# Patient Record
Sex: MALE | Race: WHITE
[De-identification: names, ages, dates, MRNs, and addresses within clinical notes are randomized per-mention and may not be internally consistent; named-entity substitution may affect disease eponyms.]

---

## 2017-01-22 ENCOUNTER — HOSPITAL ENCOUNTER (EMERGENCY)
Dept: HOSPITAL 33 - ED | Age: 82
Discharge: HOME | End: 2017-01-22
Payer: MEDICARE

## 2017-01-22 VITALS — HEART RATE: 72 BPM | SYSTOLIC BLOOD PRESSURE: 125 MMHG | DIASTOLIC BLOOD PRESSURE: 55 MMHG

## 2017-01-22 VITALS — SYSTOLIC BLOOD PRESSURE: 165 MMHG | HEART RATE: 75 BPM | OXYGEN SATURATION: 97 % | DIASTOLIC BLOOD PRESSURE: 70 MMHG

## 2017-01-22 VITALS — OXYGEN SATURATION: 91 %

## 2017-01-22 DIAGNOSIS — T88.9XXA: ICD-10-CM

## 2017-01-22 DIAGNOSIS — R06.02: Primary | ICD-10-CM
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
## 2017-01-22 DIAGNOSIS — S50.812A: Primary | ICD-10-CM

## 2017-01-22 LAB
ANION GAP SERPL CALC-SCNC: 13.9 MEQ/L (ref 5–15)
BASOPHILS NFR BLD AUTO: 0.1 % (ref 0–0.4)
BUN SERPL-MCNC: 34 MG/DL (ref 9–20)
CHLORIDE SERPL-SCNC: 103 MEQ/L (ref 98–107)
CO2 SERPL-SCNC: 30.1 MEQ/L (ref 21–32)
GLUCOSE SERPL-MCNC: 129 MG/DL (ref 70–110)
MCH RBC QN AUTO: 28.7 PG (ref 26–32)
NEUTROPHILS NFR BLD AUTO: 47.1 % (ref 36–66)
PLATELET # BLD AUTO: 124 K/MM3 (ref 150–450)
POTASSIUM SERPLBLD-SCNC: 3.6 MEQ/L (ref 3.5–5.1)
RBC # BLD AUTO: 3.93 M/MM3 (ref 4.1–5.6)
SODIUM SERPL-SCNC: 143 MEQ/L (ref 136–145)
WBC # BLD AUTO: 7.5 K/MM3 (ref 4–10.5)

## 2017-01-22 PROCEDURE — 96374 THER/PROPH/DIAG INJ IV PUSH: CPT

## 2017-01-22 PROCEDURE — 85025 COMPLETE CBC W/AUTO DIFF WBC: CPT

## 2017-01-22 PROCEDURE — 80048 BASIC METABOLIC PNL TOTAL CA: CPT

## 2017-01-22 PROCEDURE — 96375 TX/PRO/DX INJ NEW DRUG ADDON: CPT

## 2017-01-22 PROCEDURE — 99282 EMERGENCY DEPT VISIT SF MDM: CPT

## 2017-01-22 PROCEDURE — 94640 AIRWAY INHALATION TREATMENT: CPT

## 2017-01-22 PROCEDURE — 99284 EMERGENCY DEPT VISIT MOD MDM: CPT

## 2017-01-22 PROCEDURE — 36000 PLACE NEEDLE IN VEIN: CPT

## 2017-01-22 PROCEDURE — 96372 THER/PROPH/DIAG INJ SC/IM: CPT

## 2017-01-22 PROCEDURE — 93005 ELECTROCARDIOGRAM TRACING: CPT

## 2017-01-22 PROCEDURE — 96360 HYDRATION IV INFUSION INIT: CPT

## 2017-01-22 PROCEDURE — 36415 COLL VENOUS BLD VENIPUNCTURE: CPT

## 2017-01-22 NOTE — ERPHSYRPT
- History of Present Illness


Time Seen by Provider: 01/22/17 08:22


Source: patient


Exam Limitations: no limitations


Patient Subjective Stated Complaint: pt reports his cat scratched him 3 days ago

-reports redness to left forearm


Triage Nursing Assessment: pt pink warm et dry-a & o x 3-redness et warmth 

noted to left forearm-radial pulse strong


Physician History: 





pt reports his cat scratched him 3 days ago-reports redness to left forearm


Timing/Duration: day(s) (5-6 days ago)


Severity: mild


Modifying Factors: Improves With: cold therapy


Associated Symptoms: denies symptoms


Allergies/Adverse Reactions: 








No Known Drug Allergies Allergy (Verified 01/22/17 08:00)


 





Home Medications: 








Levothyroxine Sodium 75 Mcg*** [Synthroid 75 Mcg***] 75 mcg PO DAILY 09/30/16 [

History]


Ropinirole HCl 0.5 mg*** [Requip 0.5 MG***] 1 mg PO DAILY 09/30/16 [History]


Albuterol 17 gm IH QID 12/08/16 [History]


Atorvastatin Calcium [Lipitor 20MG Tablet] 20 mg PO DAILY 12/08/16 [History]


Furosemide 20 mg*** [Lasix 20 mg***] 20 mg PO DAILY 12/08/16 [History]


Hydrocodone Bit/Acetaminophen [Norco  Tablet] 1 tab PO Q6H PRN 12/08/16 [

History]


Ipratropium Bromide 0.2 mg IH QID 12/08/16 [History]


Metoprolol Succinate 50 mg*** [Toprol Xl 50 MG***] 50 mg PO DAILY 12/08/16 [

History]


Furosemide 40 mg*** [Lasix 40 MG***] 40 mg PO DAILY 12/21/16 [History]





Hx Tetanus, Diphtheria Vaccination/Date Given: No


Hx Influenza Vaccination/Date Given: No


Hx Pneumococcal Vaccination/Date Given: No


Immunizations Up to Date: Yes





- Review of Systems


Constitutional: No Symptoms


Eyes: No Symptoms


Ears, Nose, & Throat: No Symptoms


Respiratory: No Symptoms


Cardiac: No Symptoms


Abdominal/Gastrointestinal: No Symptoms


Genitourinary Symptoms: No Symptoms


Musculoskeletal: No Symptoms


Skin: Other (cat scratchn on left forearm)





- Past Medical History


Pertinent Past Medical History: Yes


Neurological History: Other


ENT History: No Pertinent History


Cardiac History: High Cholesterol, Hypertension


Respiratory History: Asthma, Bronchitis, COPD, Emphysema, Sleep Apnea


Endocrine Medical History: No Pertinent History


Musculoskeletal History: Arthritis


GI Medical History: Hemorrhoids, Hernia


 History: No Pertinent History


Psycho-Social History: No Pertinent History


Male Reproductive Disorders: No Pertinent History


Other Medical History: skin cancer, Dizziness





- Past Surgical History


Past Surgical History: Yes


Neuro Surgical History: No Pertinent History


Cardiac: Other


Respiratory: No Pertinent History


Gastrointestinal: Hernia Repair


Genitourinary: No Pertinent History


Musculoskeletal: No Pertinent History


Male Surgical History: No Pertinent History


Other Surgical History: Abdominal aortic aneurysm repair, right ear surgery x3,





- Social History


Smoking Status: Former smoker


How long have you smoked: 70 YEARS


Exposure to second hand smoke: No


Drug Use: none


Patient Lives Alone: No





- Nursing Vital Signs


Nursing Vital Signs: 





 Initial Vital Signs











Temperature                    97.9 F


 


Temperature Source             Oral


 


Pulse Rate                     72


 


Respiratory Rate               22


 


Blood Pressure [Right Arm]     170/69


 


Pain Intensity                 0

















- Physical Exam


General Appearance: no apparent distress


Eye Exam: PERRL/EOMI


Ears, Nose, Throat Exam: normal ENT inspection


Extremity Exam: inflammation (left forearm), swelling


SpO2: 98


Oxygen Delivery: Room Air





- Course


Nursing assessment & vital signs reviewed: Yes





- Progress


Progress: improved


Counseled pt/family regarding: diagnosis, need for follow-up





- Departure


Time of Disposition: 08:23


Departure Disposition: Home


Clinical Impression: 


Cat scratch of forearm


Qualifiers:


 Encounter type: initial encounter Laterality: left Qualified Code(s): S50.812A 

- Abrasion of left forearm, initial encounter; W55.03XA - Scratched by cat, 

initial encounter





Condition: Stable


Critical Care Time: No


Referrals: 


ARCHANA YOO MD [Primary Care Provider] - 


Instructions:  Cat Scratch Disease/Fever


Additional Instructions: 


Please follow the instructions given to you.  Please take your medication as 

prescribed if given.  If symptoms recur or get worse, come back to the 

emergency room if you cannot reach your primary care physician, or call your 

primary care physician for an appointment.  Again if your symptoms get worse, 

come back to the emergency room.  Thanks for visiting emergency room, and let 

us take care of you.


Prescriptions: 


Mupirocin*** [Bactroban OINTMENT***] 1 gm TP BID #30 tube


Cephalexin Mh 500 mg** [Keflex 500 mg**] 500 mg PO Q6H #40 capsule

## 2017-01-22 NOTE — ERPHSYRPT
- History of Present Illness


Time Seen by Provider: 01/22/17 13:10


Source: patient, EMS


Exam Limitations: no limitations


Patient Subjective Stated Complaint: PT BROUGHT TO ED PER EMS REPORTS INCREASED 

SOB-PT STARTED ON NEW ANTIBIOTIC ANGIE 30 MIN AFTER TAKING-DENIES PAIN


Triage Nursing Assessment: PT FLUSHED WARM UPON ARRIVAL-INCREASED WORK OF 

BREATHING NOTED-WHEEZE NOTED PRIOR TO NEB TX-REDNESS NOTED BODYWIDE


Physician History: 





80-year-old male came to the emergency room with complaining of shortness of 

breath.  34 hours ago.  Patient was seen in the emergency room when he came 

with complaining of cat scratch on his left forearm and was given intramuscular 

Rocephin and cephalexin 500 mg 4 times a day.  Patient went home and took the 

first dose of cephalexin and in half an hour he started having epigastric pain, 

substernal pain, which shortness of breath.  So he called ambulance and patient 

was brought into the emergency room.  Patient did not have any chest pain when 

he came to the emergency room was mildly short of breath.  He was complaining 

of some epigastric discomfort.


Timing/Duration: today


Severity of Dyspnea-Max: mild


Severity of Dyspnea-Current: mild


Possible Cause: no prior episodes


Modifying Factors: Improves With: nothing


Associated Symptoms: denies symptoms


Allergies/Adverse Reactions: 








No Known Drug Allergies Allergy (Verified 01/22/17 12:32)


 





Home Medications: 








Levothyroxine Sodium 75 Mcg*** [Synthroid 75 Mcg***] 75 mcg PO DAILY 09/30/16 [

History]


Ropinirole HCl 0.5 mg*** [Requip 0.5 MG***] 1 mg PO DAILY 09/30/16 [History]


Albuterol 17 gm IH QID 12/08/16 [History]


Atorvastatin Calcium [Lipitor 20MG Tablet] 20 mg PO DAILY 12/08/16 [History]


Furosemide 20 mg*** [Lasix 20 mg***] 20 mg PO DAILY 12/08/16 [History]


Hydrocodone Bit/Acetaminophen [Norco  Tablet] 1 tab PO Q6H PRN 12/08/16 [

History]


Ipratropium Bromide 0.2 mg IH QID 12/08/16 [History]


Metoprolol Succinate 50 mg*** [Toprol Xl 50 MG***] 50 mg PO DAILY 12/08/16 [

History]


Furosemide 40 mg*** [Lasix 40 MG***] 40 mg PO DAILY 12/21/16 [History]





Hx Tetanus, Diphtheria Vaccination/Date Given: No


Hx Influenza Vaccination/Date Given: No


Hx Pneumococcal Vaccination/Date Given: No


Immunizations Up to Date: Yes





- Review of Systems


Constitutional: No Symptoms


Eyes: No Symptoms


Ears, Nose, & Throat: No Symptoms


Respiratory: Cough, Dyspnea


Cardiac: No Symptoms


Abdominal/Gastrointestinal: No Symptoms


Genitourinary Symptoms: No Symptoms


Musculoskeletal: No Symptoms


Skin: No Symptoms





- Past Medical History


Pertinent Past Medical History: Yes


Neurological History: Other


ENT History: No Pertinent History


Cardiac History: High Cholesterol, Hypertension


Respiratory History: Asthma, Bronchitis, COPD, Emphysema, Sleep Apnea


Endocrine Medical History: No Pertinent History


Musculoskeletal History: Arthritis


GI Medical History: Hemorrhoids, Hernia


 History: No Pertinent History


Psycho-Social History: No Pertinent History


Male Reproductive Disorders: No Pertinent History


Other Medical History: skin cancer, Dizziness





- Past Surgical History


Past Surgical History: Yes


Neuro Surgical History: No Pertinent History


Cardiac: Other


Respiratory: No Pertinent History


Gastrointestinal: Hernia Repair


Genitourinary: No Pertinent History


Musculoskeletal: No Pertinent History


Male Surgical History: No Pertinent History


Other Surgical History: Abdominal aortic aneurysm repair, right ear surgery x3,





- Social History


Smoking Status: Former smoker


How long have you smoked: 70 YEARS


Exposure to second hand smoke: No


Drug Use: none


Patient Lives Alone: No





- Nursing Vital Signs


Nursing Vital Signs: 





 Initial Vital Signs











Pulse Rate                     78


 


Respiratory Rate               28


 


Blood Pressure [Right Arm]     136/70

















- Physical Exam


General Appearance: mild distress


Eye Exam: PERRL/EOMI


Ears, Nose, Throat Exam: hearing grossly normal


Neck Exam: normal inspection


Respiratory Exam: diminished breath sounds, wheezing


Cardiovascular/Chest Exam: normal heart sounds


Abdominal/Gastrointestinal Exam: soft


Extremity Exam: non-tender


**SpO2 Interpretation**: borderline oxygenation


SpO2: 91


Oxygen Delivery: Nasal Cannula





- Course


Nursing assessment & vital signs reviewed: Yes


Ordered Tests: 





 Active Orders 24 hr











 Category Date Time Status


 


 EKG-ER Only STAT Care  01/22/17 12:44 Active


 


 IV Insertion STAT Care  01/22/17 12:44 Active


 


 IV Insertion-2nd Peripheral STAT Care  01/22/17 12:44 Active


 


 Oxygen-ED Only NASAL CANNULA 2 lpm Care  01/22/17 12:45 Active


 


 BMP Stat Lab  01/22/17 12:28 Received


 


 CBC W DIFF Stat Lab  01/22/17 12:28 Completed


 


 Respiratory Nebulizer STAT RT  01/22/17 12:31 Completed








Medication Summary











Generic Name Dose Route Start Last Admin





  Trade Name Sherley  PRN Reason Stop Dose Admin


 


Sodium Chloride  1,000 mls @ 50 mls/hr  01/22/17 12:30  01/22/17 12:38





  Sodium Chloride 0.9% 1000 Ml  IV  02/21/17 12:29  50 mls/hr





  .Q20H FREDERICK   Administration














Discontinued Medications














Generic Name Dose Route Start Last Admin





  Trade Name Sherley  PRN Reason Stop Dose Admin


 


Albuterol Sulfate  2.5 mg  01/22/17 12:30  01/22/17 12:35





  Proventil 2.5 Mg/3 Ml Neb***  IH  01/22/17 12:31  2.5 mg





  STAT ONE   Administration


 


Albuterol Sulfate  Confirm  01/22/17 12:31  





  Proventil 2.5 Mg/3 Ml Neb***  Administered  01/22/17 12:32  





  Dose   





  2.5 mg   





  IH   





  .STK-MED ONE   


 


Famotidine  20 mg  01/22/17 12:19  01/22/17 12:38





  Pepcid 20 Mg Vial***  IV  01/22/17 12:20  20 mg





  STAT ONE   Administration


 


Famotidine  Confirm  01/22/17 12:34  





  Pepcid 20 Mg Vial***  Administered  01/22/17 12:35  





  Dose   





  20 mg   





  IV   





  .STK-MED ONE   


 


Sodium Chloride  Confirm  01/22/17 12:34  





  Sodium Chloride 0.9% 1000 Ml  Administered  01/22/17 12:35  





  Dose   





  1,000 mls @ ud   





  .ROUTE   





  .STK-MED ONE   


 


Pantoprazole Sodium  40 mg  01/22/17 12:19  01/22/17 12:38





  Protonix 40 Mg Iv***  IV  01/22/17 12:20  40 mg





  STAT ONE   Administration


 


Pantoprazole Sodium  Confirm  01/22/17 12:34  





  Protonix 40 Mg Iv***  Administered  01/22/17 12:35  





  Dose   





  40 mg   





  IV   





  .STK-MED ONE   











Lab/Rad Data: 





 Laboratory Result Diagrams





 01/22/17 12:28 





 Laboratory Results











  01/22/17 Range/Units





  12:28 


 


WBC  7.5  (4.0-10.5)  K/mm3


 


RBC  3.93 L  (4.1-5.6)  M/mm3


 


Hgb  11.3 L  (12.5-18.0)  gm/dl


 


Hct  35.2 L  (42-50)  %


 


MCV  89.6  ()  fl


 


MCH  28.7  (26-32)  pg


 


MCHC  32.1  (32-36)  g/dl


 


RDW  14.4 H  (11.5-14.0)  %


 


Plt Count  124 L  (150-450)  K/mm3


 


MPV  10.9 H  (6-9.5)  fl


 


Gran %  47.1  (36.0-66.0)  %


 


Lymphocytes %  44.5 H  (24.0-44.0)  %


 


Monocytes %  7.1  (0.0-12.0)  %


 


Eosinophils %  1.2  (0.00-5.0)  %


 


Basophils %  0.1  (0.0-0.4)  %


 


Basophils #  0.01  (0-0.4)  














- Progress


Progress: improved


Air Movement: good


Blood Culture(s) Obtained: No


Antibiotics given: No


Counseled pt/family regarding: lab results, diagnosis, need for follow-up





- Departure


Time of Disposition: 13:18


Departure Disposition: Home


Clinical Impression: 


 Shortness of breath





Side effects of treatment


Qualifiers:


 Encounter type: initial encounter Qualified Code(s): T88.9XXA - Complication 

of surgical and medical care, unspecified, initial encounter





Condition: Stable


Critical Care Time: No


Referrals: 


FRAN YOO MD [Primary Care Provider] - 


Instructions:  Adverse Drug Reaction -- GI Intolerance


Additional Instructions: 


hold cephalexin , follow up with Dr Fran yoo on tuesday

## 2021-02-17 ENCOUNTER — HOSPITAL ENCOUNTER (EMERGENCY)
Dept: HOSPITAL 33 - ED | Age: 86
Discharge: ANOTHER HOSPITAL | End: 2021-02-17
Payer: MEDICARE

## 2021-02-17 VITALS — HEART RATE: 71 BPM | DIASTOLIC BLOOD PRESSURE: 76 MMHG | SYSTOLIC BLOOD PRESSURE: 151 MMHG

## 2021-02-17 VITALS — OXYGEN SATURATION: 96 %

## 2021-02-17 DIAGNOSIS — N18.4: ICD-10-CM

## 2021-02-17 DIAGNOSIS — R26.2: ICD-10-CM

## 2021-02-17 DIAGNOSIS — N17.9: ICD-10-CM

## 2021-02-17 DIAGNOSIS — Z79.899: ICD-10-CM

## 2021-02-17 DIAGNOSIS — I71.4: Primary | ICD-10-CM

## 2021-02-17 DIAGNOSIS — R77.8: ICD-10-CM

## 2021-02-17 DIAGNOSIS — M54.9: ICD-10-CM

## 2021-02-17 DIAGNOSIS — M62.82: ICD-10-CM

## 2021-02-17 DIAGNOSIS — Z79.891: ICD-10-CM

## 2021-02-17 DIAGNOSIS — M54.2: ICD-10-CM

## 2021-02-17 DIAGNOSIS — S32.059A: ICD-10-CM

## 2021-02-17 DIAGNOSIS — E78.00: ICD-10-CM

## 2021-02-17 DIAGNOSIS — I10: ICD-10-CM

## 2021-02-17 LAB
ALBUMIN SERPL-MCNC: 4.2 G/DL (ref 3.5–5)
ALP SERPL-CCNC: 69 U/L (ref 38–126)
ALT SERPL-CCNC: 17 U/L (ref 0–50)
AMOURPHOUS CRYSTAL: (no result) /HPF
ANION GAP SERPL CALC-SCNC: 18.6 MEQ/L (ref 5–15)
AST SERPL QL: 61 U/L (ref 17–59)
BASOPHILS # BLD AUTO: 0.03 10*3/UL (ref 0–0.4)
BASOPHILS NFR BLD AUTO: 0.3 % (ref 0–0.4)
BILIRUB BLD-MCNC: 0.6 MG/DL (ref 0.2–1.3)
BUN SERPL-MCNC: 58 MG/DL (ref 9–20)
CALCIUM SPEC-MCNC: 9.2 MG/DL (ref 8.4–10.2)
CHLORIDE SERPL-SCNC: 103 MMOL/L (ref 98–107)
CO2 SERPL-SCNC: 21 MMOL/L (ref 22–30)
CREAT SERPL-MCNC: 2.94 MG/DL (ref 0.66–1.25)
EOSINOPHIL # BLD AUTO: 0.02 10*3/UL (ref 0–0.5)
GFR SERPLBLD BASED ON 1.73 SQ M-ARVRAT: 21.5 ML/MIN
GLUCOSE SERPL-MCNC: 85 MG/DL (ref 74–106)
GLUCOSE UR-MCNC: NEGATIVE MG/DL
HCT VFR BLD AUTO: 34.9 % (ref 42–50)
HGB BLD-MCNC: 10.8 GM/DL (ref 12.5–18)
INR PPP: 1.27 (ref 0.8–3)
LYMPHOCYTES # SPEC AUTO: 0.83 10*3/UL (ref 1–4.6)
MCH RBC QN AUTO: 26.1 PG (ref 26–32)
MCHC RBC AUTO-ENTMCNC: 30.9 G/DL (ref 32–36)
MONOCYTES # BLD AUTO: 0.74 10*3/UL (ref 0–1.3)
PLATELET # BLD AUTO: 188 K/MM3 (ref 150–450)
POTASSIUM SERPLBLD-SCNC: 4.9 MMOL/L (ref 3.5–5.1)
PROT SERPL-MCNC: 7.6 G/DL (ref 6.3–8.2)
PROT UR STRIP-MCNC: 100 MG/DL
PROTHROMBIN TIME: 14.4 SECONDS (ref 8.83–12.87)
RBC # BLD AUTO: 4.14 M/MM3 (ref 4.1–5.6)
RBC #/AREA URNS HPF: (no result) /HPF (ref 0–2)
SODIUM SERPL-SCNC: 138 MMOL/L (ref 137–145)
WBC # BLD AUTO: 10 K/MM3 (ref 4–10.5)
WBC #/AREA URNS HPF: (no result) /HPF (ref 0–5)

## 2021-02-17 PROCEDURE — 87086 URINE CULTURE/COLONY COUNT: CPT

## 2021-02-17 PROCEDURE — 36415 COLL VENOUS BLD VENIPUNCTURE: CPT

## 2021-02-17 PROCEDURE — 93005 ELECTROCARDIOGRAM TRACING: CPT

## 2021-02-17 PROCEDURE — 99291 CRITICAL CARE FIRST HOUR: CPT

## 2021-02-17 PROCEDURE — 84484 ASSAY OF TROPONIN QUANT: CPT

## 2021-02-17 PROCEDURE — 81001 URINALYSIS AUTO W/SCOPE: CPT

## 2021-02-17 PROCEDURE — 99285 EMERGENCY DEPT VISIT HI MDM: CPT

## 2021-02-17 PROCEDURE — 70450 CT HEAD/BRAIN W/O DYE: CPT

## 2021-02-17 PROCEDURE — 85025 COMPLETE CBC W/AUTO DIFF WBC: CPT

## 2021-02-17 PROCEDURE — 76376 3D RENDER W/INTRP POSTPROCES: CPT

## 2021-02-17 PROCEDURE — 82550 ASSAY OF CK (CPK): CPT

## 2021-02-17 PROCEDURE — 83605 ASSAY OF LACTIC ACID: CPT

## 2021-02-17 PROCEDURE — 83735 ASSAY OF MAGNESIUM: CPT

## 2021-02-17 PROCEDURE — 74176 CT ABD & PELVIS W/O CONTRAST: CPT

## 2021-02-17 PROCEDURE — 85610 PROTHROMBIN TIME: CPT

## 2021-02-17 PROCEDURE — 72125 CT NECK SPINE W/O DYE: CPT

## 2021-02-17 PROCEDURE — 80053 COMPREHEN METABOLIC PANEL: CPT

## 2021-02-17 PROCEDURE — 71250 CT THORAX DX C-: CPT

## 2021-02-17 NOTE — ERPHSYRPT
- History of Present Illness


Time Seen by Provider: 02/17/21 19:05


Source: patient, family, EMS


Patient Subjective Stated Complaint: Medics states "HE fell yesterday and hit 

the front of his head and he refused to come to the ed.  He fell again today and

his son found him on the floor and he says his back and the back of his head 

hurts."


Triage Nursing Assessment: PT presented via ambulance and placed in room 3. Pt 

has bruising noted to forehead, bruising noted to upper back. PT alert and 

confused. PT able to answer some questions but not others .  PT son in room with

him.


Physician History: 





Patient has had two falls in the past 24 hours, with one yesterday 


Occurred: this afternoon, yesterday


Reason for Fall: unknown, fell from standing pos


Injuries/Pain Location: head, neck, back


Loss of Consciousness: no loss of consciousness


Quality: aching


Severity of Pain-Max: moderate


Severity of Pain-Current: mild


Modifying Factors: Worsens With: movement


Associated Symptoms (Fall): back pain, neck pain, trouble walking, No abdominal 

pain, No confusion, No chest pain, No dizziness, No extremity injury, No headach

e, No lightheadedness, No seizures, No shortness of breath, No vomiting


Allergies/Adverse Reactions: 








cephalexin [From Keflex] Allergy (Severe, Verified 02/17/21 19:22)


   Difficulty Breathing





Home Medications: 








Levothyroxine Sodium 75 Mcg*** [Synthroid 75 Mcg***] 75 mcg PO DAILY 09/30/16 

[History]


Ropinirole HCl 0.5 mg*** [Requip 0.5 MG***] 1 mg PO DAILY 09/30/16 [History]


Albuterol 17 gm IH QID 12/08/16 [History]


Atorvastatin Calcium [Lipitor 20MG Tablet] 20 mg PO DAILY 12/08/16 [History]


Furosemide 20 mg*** [Lasix 20 mg***] 20 mg PO DAILY 12/08/16 [History]


Hydrocodone Bit/Acetaminophen [Norco  Tablet] 1 tab PO Q6H PRN 12/08/16 [H

istory]


Ipratropium Bromide 0.2 mg IH QID 12/08/16 [History]


Metoprolol Succinate 50 mg*** [Toprol Xl 50 MG***] 50 mg PO DAILY 12/08/16 

[History]


Furosemide 40 mg*** [Lasix 40 MG***] 40 mg PO DAILY 12/21/16 [History]





Hx Tetanus, Diphtheria Vaccination/Date Given: No


Hx Influenza Vaccination/Date Given: No


Hx Pneumococcal Vaccination/Date Given: No


Immunizations Up to Date: Yes





Travel Risk





- International Travel


Have you traveled outside of the country in past 3 weeks: No





- Coronavirus Screening


Are you exhibiting any of the following symptoms?: No


Close contact with a COVID-19 positive Pt in past 14-21 Days: No





- Review of Systems


Constitutional: No Fever, No Chills


Eyes: No Discharge, No Eye Pain


Ears, Nose, & Throat: No Nose Discharge, No Epistaxis, No Mouth Pain


Respiratory: No Cough, No Dyspnea


Cardiac: No Chest Pain, No Edema, No Syncope


Abdominal/Gastrointestinal: No Abdominal Pain, No Nausea, No Vomiting, No 

Diarrhea


Genitourinary Symptoms: No Dysuria, No Hematuria


Musculoskeletal: Back Pain, Neck Pain


Skin: No Rash


Neurological: No Dizziness, No Focal Weakness, No Sensory Changes


Psychological: No Symptoms


Endocrine: No Symptoms


Hematologic/Lymphatic: No Easy Bleeding, No Easy Bruising


All Other Systems: Reviewed and Negative





- Past Medical History


Pertinent Past Medical History: Yes


Neurological History: Other


ENT History: No Pertinent History


Cardiac History: High Cholesterol, Hypertension


Respiratory History: Asthma, Bronchitis, COPD, Emphysema, Sleep Apnea


Endocrine Medical History: No Pertinent History


Musculoskeletal History: Arthritis


GI Medical History: Hemorrhoids, Hernia


 History: No Pertinent History


Psycho-Social History: No Pertinent History


Male Reproductive Disorders: No Pertinent History


Other Medical History: skin cancer, Dizziness





- Past Surgical History


Past Surgical History: Yes


Neuro Surgical History: No Pertinent History


Cardiac: Other


Respiratory: No Pertinent History


Gastrointestinal: Hernia Repair


Genitourinary: No Pertinent History


Musculoskeletal: No Pertinent History


Male Surgical History: No Pertinent History


Other Surgical History: Abdominal aortic aneurysm repair, right ear surgery x3,





- Social History


Smoking Status: Former smoker


How long have you smoked: 70 YEARS


Exposure to second hand smoke: No


Drug Use: none


Patient Lives Alone: Yes





- Nursing Vital Signs


Nursing Vital Signs: 


                               Initial Vital Signs











Temperature  97.4 F   02/17/21 18:54


 


Pulse Rate  81   02/17/21 18:54


 


Respiratory Rate  20   02/17/21 18:54


 


Blood Pressure  199/92   02/17/21 18:54


 


O2 Sat by Pulse Oximetry  96   02/17/21 18:54








                                   Pain Scale











Pain Intensity                 4

















- Eloy Coma Score


Best Eye Response (Eloy): (4) open spontaneously


Best Verbal Response (Eloy): (5) oriented


Best Motor Response (Eloy): (6) obeys commands


Eloy Total: 15





- Physical Exam


General Appearance: no apparent distress, alert


Head Injury: lacerations (superficial from the right forehead), swelling, No 

Lazo's Sign, No raccoon eyes, No tenderness


Eye Exam: PERRL/EOMI, eyes nml inspection, No scleral icterus


ENT Exam: airway nml, evidence of ENT injury


Neck Exam: supple, trachea midline, normal alignment, normal inspection, c-

collar in place (cleared and removed on physical examination on entrance into 

the emergency department), No limited range of motion, No paraspinous muscle 

tender, No tenderness, No mid-line tenderness


Respiratory/Chest Exam: normal breath sounds, wheezing, No chest tenderness, No 

respiratory distress


Cardiovascular Exam: normal heart sounds, regular rate/rhythm


Gastrointestinal Exam: soft, normal bowel sounds, No tenderness, No distention, 

No guarding, No ecchymosis


Back Exam: normal inspection, No CVA tenderness, No vertebral tenderness


Extremity Exam: normal inspection, normal range of motion, pelvis stable, No 

contusions, No deformities, No bony point tenderness


Neurologic Exam: alert, cooperative, CNs II-XII nml as tested, normal 

mood/affect, sensation nml, No motor deficits


Skin Exam: normal color, warm, dry


**SpO2 Interpretation**: normal


SpO2: 96


O2 Delivery: Room Air





- Course


Nursing assessment & vital signs reviewed: Yes


EKG Interpreted by Me: RATE (77), Sinus Rhythm, Left Axis Deviation, prolonged 

QT interval, Right Bundle Branch Block, Other (No acute ST or T wave changes in 

comparison to EKG from 1/20/2017; no appreciable change in comparison EKG from 

1/20/2017)





- CT Exams


  ** Head


CT Interpretation: Tele-radiologist Report, No Fracture, No/Intracranial 

Hemorrhag





  ** Cervical Spine


CT Interpretation: Tele-radiologist Report, No Fracture, Other





  ** Chest


CT Interpretation: No Fracture, Other (, Pneumothorax, but enlarged pulmonary 

arteries favoring pulmonary hypertension; bibasilar fibrosis/scarring with new  

cardiomegaly)





  ** Abdomen/Pelvis


CT Interpretation: Other (Compared to 2/8/2010.  Enlarging 6 x 6.6 cm AAA, 

previously 4.9 x 4.9 cm with again aortobiiliac stent graft.  No new acute ab

dominal, pelvic abnormalities.  Bony structures intact with osteopenia.  

Multilevel degenerative disc disease and new left hip arthroplasty and worsening

 moderate right hip D)


Ordered Tests: 


                               Active Orders 24 hr











 Category Date Time Status


 


 EKG-ER Only STAT Care  02/17/21 19:40 Active


 


 ABDOMEN AND PELVIS W/0 CONTRAS [CT] Stat Exams  02/17/21 19:36 Taken


 


 CERVICAL SPINE WO CONTRAST [CT] Stat Exams  02/17/21 19:31 Taken


 


 CHEST WITHOUT CONTRAST [CT] Stat Exams  02/17/21 19:32 Taken


 


 HEAD WITHOUT CONTRAST [CT] Stat Exams  02/17/21 19:29 Taken


 


 RECONSTRUCTION [CT] Routine Exams  02/17/21 21:17 Taken


 


 RECONSTRUCTION [CT] Routine Exams  02/17/21 21:17 Taken


 


 CBC W DIFF Stat Lab  02/17/21 19:50 Completed


 


 CK (IN-HOUSE) [CK-Creatinine Phosphokinase] Stat Lab  02/17/21 19:50 Completed


 


 CMP Stat Lab  02/17/21 19:50 Completed


 


 CULTURE,URINE Stat Lab  02/17/21 20:03 Ordered


 


 Lactic Acid Stat Lab  02/17/21 20:00 Completed


 


 MAG [MAGNESIUM] Stat Lab  02/17/21 19:50 Completed


 


 PT INR [PROTIME WITH INR] Stat Lab  02/17/21 19:50 Completed


 


 TROPONIN Q3H Lab  02/17/21 19:50 Completed


 


 TROPONIN Q3H Lab  02/17/21 22:45 Ordered


 


 UA W/RFX UR CULTURE Stat Lab  02/17/21 20:06 Completed








Medication Summary














Discontinued Medications














Generic Name Dose Route Start Last Admin





  Trade Name Freq  PRN Reason Stop Dose Admin


 


Aspirin  324 mg  02/17/21 21:10  02/17/21 21:13





  Baby Aspirin 81 Mg Chew***  PO  02/17/21 21:11  324 mg





  STAT ONE   Administration


 


Sodium Chloride  1,000 mls @ 999 mls/hr  02/17/21 19:48  02/17/21 19:51





  Sodium Chloride 0.9% 1000 Ml  IV  02/17/21 20:48  999 mls/hr





  .Q1H1M STA   Administration


 


Sodium Chloride  Confirm  02/17/21 19:50 





  Sodium Chloride 0.9% 1000 Ml  Administered  02/17/21 19:51 





  Dose  





  1,000 mls @ ud  





  .ROUTE  





  .STK-MED ONE  











Lab/Rad Data: 


                           Laboratory Result Diagrams





                                 02/17/21 19:50 





                                 02/17/21 19:50 





                               Laboratory Results











  02/17/21 02/17/21 02/17/21 Range/Units





  20:06 20:00 19:50 


 


WBC     (4.0-10.5)  K/mm3


 


RBC     (4.1-5.6)  M/mm3


 


Hgb     (12.5-18.0)  gm/dl


 


Hct     (42-50)  %


 


MCV     ()  fl


 


MCH     (26-32)  pg


 


MCHC     (32-36)  g/dl


 


RDW     (11.5-14.0)  %


 


Plt Count     (150-450)  K/mm3


 


MPV     (7.5-11.0)  fl


 


Gran %     (36.0-66.0)  %


 


Eos # (Auto)     (0-0.5)  


 


Absolute Lymphs (auto)     (1.0-4.6)  


 


Absolute Monos (auto)     (0.0-1.3)  


 


Lymphocytes %     (24.0-44.0)  %


 


Monocytes %     (0.0-12.0)  %


 


Eosinophils %     (0.00-5.0)  %


 


Basophils %     (0.0-0.4)  %


 


Absolute Granulocytes     (1.4-6.9)  


 


Basophils #     (0-0.4)  


 


PT     (8.83-12.87)  SECONDS


 


INR     (0.8-3.0)  


 


Sodium     (137-145)  mmol/L


 


Potassium     (3.5-5.1)  mmol/L


 


Chloride     ()  mmol/L


 


Carbon Dioxide     (22-30)  mmol/L


 


Anion Gap     (5-15)  MEQ/L


 


BUN     (9-20)  mg/dL


 


Creatinine     (0.66-1.25)  mg/dL


 


Estimated GFR     ML/MIN


 


Glucose     ()  mg/dL


 


Lactic Acid   1.2   (0.4-2.0)  


 


Calcium     (8.4-10.2)  mg/dL


 


Magnesium     (1.6-2.3)  mg/dL


 


Total Bilirubin     (0.2-1.3)  mg/dL


 


AST     (17-59)  U/L


 


ALT     (0-50)  U/L


 


Alkaline Phosphatase     ()  U/L


 


Creatine Kinase     ()  U/L


 


Troponin I    0.105 H*  (0.000-0.034)  ng/mL


 


Serum Total Protein     (6.3-8.2)  g/dL


 


Albumin     (3.5-5.0)  g/dL


 


Urine Color  YELLOW    (YELLOW)  


 


Urine Appearance  CLOUDY    (CLEAR)  


 


Urine pH  5.0    (5-6)  


 


Ur Specific Gravity  1.018    (1.005-1.025)  


 


Urine Protein  100    (Negative)  


 


Urine Ketones  TRACE    (NEGATIVE)  


 


Urine Blood  MODERATE    (0-5)  Nikhil/ul


 


Urine Nitrite  NEGATIVE    (NEGATIVE)  


 


Urine Bilirubin  NEGATIVE    (NEGATIVE)  


 


Urine Urobilinogen  NEGATIVE    (0-1)  mg/dL


 


Ur Leukocyte Esterase  NEGATIVE    (NEGATIVE)  


 


Urine WBC (Auto)  6-10    (0-5)  /HPF


 


Urine RBC (Auto)  3-5    (0-2)  /HPF


 


U Epithel Cells (Auto)  NONE    (FEW)  /HPF


 


Urine Bacteria (Auto)  FEW    (NEGATIVE)  /HPF


 


Amorphous Crystals  FEW    (NEGATIVE)  /HPF


 


Urine Mucus (Auto)  SLIGHT    (NEGATIVE)  /HPF


 


Urine Culture Reflexed  ORDERED SEPARATELY    (NO)  


 


Urine Glucose  NEGATIVE    (NEGATIVE)  mg/dL














  02/17/21 02/17/21 02/17/21 Range/Units





  19:50 19:50 19:50 


 


WBC     (4.0-10.5)  K/mm3


 


RBC     (4.1-5.6)  M/mm3


 


Hgb     (12.5-18.0)  gm/dl


 


Hct     (42-50)  %


 


MCV     ()  fl


 


MCH     (26-32)  pg


 


MCHC     (32-36)  g/dl


 


RDW     (11.5-14.0)  %


 


Plt Count     (150-450)  K/mm3


 


MPV     (7.5-11.0)  fl


 


Gran %     (36.0-66.0)  %


 


Eos # (Auto)     (0-0.5)  


 


Absolute Lymphs (auto)     (1.0-4.6)  


 


Absolute Monos (auto)     (0.0-1.3)  


 


Lymphocytes %     (24.0-44.0)  %


 


Monocytes %     (0.0-12.0)  %


 


Eosinophils %     (0.00-5.0)  %


 


Basophils %     (0.0-0.4)  %


 


Absolute Granulocytes     (1.4-6.9)  


 


Basophils #     (0-0.4)  


 


PT   14.4 H   (8.83-12.87)  SECONDS


 


INR   1.27   (0.8-3.0)  


 


Sodium     (137-145)  mmol/L


 


Potassium     (3.5-5.1)  mmol/L


 


Chloride     ()  mmol/L


 


Carbon Dioxide     (22-30)  mmol/L


 


Anion Gap     (5-15)  MEQ/L


 


BUN     (9-20)  mg/dL


 


Creatinine     (0.66-1.25)  mg/dL


 


Estimated GFR     ML/MIN


 


Glucose     ()  mg/dL


 


Lactic Acid     (0.4-2.0)  


 


Calcium     (8.4-10.2)  mg/dL


 


Magnesium  2.2    (1.6-2.3)  mg/dL


 


Total Bilirubin     (0.2-1.3)  mg/dL


 


AST     (17-59)  U/L


 


ALT     (0-50)  U/L


 


Alkaline Phosphatase     ()  U/L


 


Creatine Kinase    2074 H  ()  U/L


 


Troponin I     (0.000-0.034)  ng/mL


 


Serum Total Protein     (6.3-8.2)  g/dL


 


Albumin     (3.5-5.0)  g/dL


 


Urine Color     (YELLOW)  


 


Urine Appearance     (CLEAR)  


 


Urine pH     (5-6)  


 


Ur Specific Gravity     (1.005-1.025)  


 


Urine Protein     (Negative)  


 


Urine Ketones     (NEGATIVE)  


 


Urine Blood     (0-5)  Nikhil/ul


 


Urine Nitrite     (NEGATIVE)  


 


Urine Bilirubin     (NEGATIVE)  


 


Urine Urobilinogen     (0-1)  mg/dL


 


Ur Leukocyte Esterase     (NEGATIVE)  


 


Urine WBC (Auto)     (0-5)  /HPF


 


Urine RBC (Auto)     (0-2)  /HPF


 


U Epithel Cells (Auto)     (FEW)  /HPF


 


Urine Bacteria (Auto)     (NEGATIVE)  /HPF


 


Amorphous Crystals     (NEGATIVE)  /HPF


 


Urine Mucus (Auto)     (NEGATIVE)  /HPF


 


Urine Culture Reflexed     (NO)  


 


Urine Glucose     (NEGATIVE)  mg/dL














  02/17/21 02/17/21 Range/Units





  19:50 19:50 


 


WBC   10.0  (4.0-10.5)  K/mm3


 


RBC   4.14  (4.1-5.6)  M/mm3


 


Hgb   10.8 L  (12.5-18.0)  gm/dl


 


Hct   34.9 L  (42-50)  %


 


MCV   84.3  ()  fl


 


MCH   26.1  (26-32)  pg


 


MCHC   30.9 L  (32-36)  g/dl


 


RDW   15.3 H  (11.5-14.0)  %


 


Plt Count   188  (150-450)  K/mm3


 


MPV   10.7  (7.5-11.0)  fl


 


Gran %   83.8 H  (36.0-66.0)  %


 


Eos # (Auto)   0.02  (0-0.5)  


 


Absolute Lymphs (auto)   0.83 L  (1.0-4.6)  


 


Absolute Monos (auto)   0.74  (0.0-1.3)  


 


Lymphocytes %   8.3 L  (24.0-44.0)  %


 


Monocytes %   7.4  (0.0-12.0)  %


 


Eosinophils %   0.2  (0.00-5.0)  %


 


Basophils %   0.3  (0.0-0.4)  %


 


Absolute Granulocytes   8.39 H  (1.4-6.9)  


 


Basophils #   0.03  (0-0.4)  


 


PT    (8.83-12.87)  SECONDS


 


INR    (0.8-3.0)  


 


Sodium  138   (137-145)  mmol/L


 


Potassium  4.9   (3.5-5.1)  mmol/L


 


Chloride  103   ()  mmol/L


 


Carbon Dioxide  21 L   (22-30)  mmol/L


 


Anion Gap  18.6 H   (5-15)  MEQ/L


 


BUN  58 H   (9-20)  mg/dL


 


Creatinine  2.94 H   (0.66-1.25)  mg/dL


 


Estimated GFR  21.5   ML/MIN


 


Glucose  85   ()  mg/dL


 


Lactic Acid    (0.4-2.0)  


 


Calcium  9.2   (8.4-10.2)  mg/dL


 


Magnesium    (1.6-2.3)  mg/dL


 


Total Bilirubin  0.60   (0.2-1.3)  mg/dL


 


AST  61 H   (17-59)  U/L


 


ALT  17   (0-50)  U/L


 


Alkaline Phosphatase  69   ()  U/L


 


Creatine Kinase    ()  U/L


 


Troponin I    (0.000-0.034)  ng/mL


 


Serum Total Protein  7.6   (6.3-8.2)  g/dL


 


Albumin  4.2   (3.5-5.0)  g/dL


 


Urine Color    (YELLOW)  


 


Urine Appearance    (CLEAR)  


 


Urine pH    (5-6)  


 


Ur Specific Gravity    (1.005-1.025)  


 


Urine Protein    (Negative)  


 


Urine Ketones    (NEGATIVE)  


 


Urine Blood    (0-5)  Nikhil/ul


 


Urine Nitrite    (NEGATIVE)  


 


Urine Bilirubin    (NEGATIVE)  


 


Urine Urobilinogen    (0-1)  mg/dL


 


Ur Leukocyte Esterase    (NEGATIVE)  


 


Urine WBC (Auto)    (0-5)  /HPF


 


Urine RBC (Auto)    (0-2)  /HPF


 


U Epithel Cells (Auto)    (FEW)  /HPF


 


Urine Bacteria (Auto)    (NEGATIVE)  /HPF


 


Amorphous Crystals    (NEGATIVE)  /HPF


 


Urine Mucus (Auto)    (NEGATIVE)  /HPF


 


Urine Culture Reflexed    (NO)  


 


Urine Glucose    (NEGATIVE)  mg/dL














- Progress


Progress: unchanged


Discussed with : Kavita (At 20:45, spoke with Dr. Yoo, patient's 

physician, about his elevated creatinine, significant elevated CPK, and elevated

troponin.  Dr. Yoo recommended transferring to [facility]

in [place], [place]), Other (22:05, discussed the patient with Dr. Wellington, 

emergency department attending at [facility].  Dr. Wellington accepted 

the patient for transfer and there is no need to start any heparin drip at this 

time and they will determine at [facility] with a repeat troponin if 

they need to do)


Counseled pt/family regarding: lab results, diagnosis, need for follow-up (Due 

to patient having worsening renal function, significant elevated CK and elevated

troponin, patient be transferred to [facility] for continued 

evaluation, management and any other definitive therapy for the other incidental

findings of worsening condition such as his AAA ), rad results





- Departure


Departure Disposition: Transfer ([facility])


Clinical Impression: 


 Elevated troponin, AAA (abdominal aortic aneurysm) without rupture





Acute on chronic renal failure


Qualifiers:


 Acute renal failure type: unspecified Chronic kidney disease stage: stage 4 

(severe) Qualified Code(s): N17.9 - Acute kidney failure, unspecified; N18.4 - 

Chronic kidney disease, stage 4 (severe)





Rhabdomyolysis


Qualifiers:


 Rhabdomyolysis type: non-traumatic Qualified Code(s): M62.82 - Rhabdomyolysis





L5 vertebral fracture


Qualifiers:


 Encounter type: initial encounter Fracture type: closed 





Hypertension


Qualifiers:


 Hypertension type: essential hypertension Qualified Code(s): I10 - Essential 

(primary) hypertension





Condition: Fair


Critical Care Time: Yes


Critical Care Time(excluding separately billable procedures): Critical 30-74 

mins


Referrals: 


ARCHANA YOO MD [Primary Care Provider] -

## 2021-02-18 NOTE — XRAY
Indication: Chest/back pain following fall.



Multiple contiguous axial images obtained through the chest without contrast.



Comparison: February 8 and June 28, 2010.



Lungs are inflated with worsening bibasilar subsegmental atelectasis/scarring.

 5 mm peripheral left lower lobe noncalcified nodule, previously 3 mm and

favored to be benign, probably granulomatous.  No suspicious pulmonary mass,

infiltrate, effusion, or pneumothorax.



Heart is now enlarged again with scattered coronary calcifications.  Pulmonary

arteries remain enlarged favoring pulmonary hypertension.  Aorta is again

moderately arteriosclerotic without aneurysmal dilatation.  Stable right hilar

calcified nodes.



Bony thorax again demonstrates osteopenia, mild degenerative changes

throughout the spine/both shoulders, and tiny right glenoid process bone

island.  New finding old right 6 rib fracture.  No acute fracture or

suspicious bony lesions.



CT abdomen/pelvis reported separately.



Impression:

1.  Worsening bibasilar atelectasis/scarring with again benign left lower lobe

noncalcified micronodule and old granulomatous disease.

2.  New cardiomegaly.  Negative for acute pneumonic process or CHF.

3.  Continue enlarged pulmonary arteries favoring pulmonary hypertension.

4.  Again incidental chronic bony findings.

## 2021-02-18 NOTE — XRAY
Indication: Pain following fall.



Sagittal, coronal, and axial reformatted images of the thoracic spine obtained

using the right data from CT chest study of the same day.



Comparison: CT chest February 8, 2010.



Osseous structures demineralized consistent with patient's age.  There is

progressive worsening minimal/mild multilevel lumbar degenerative endplate

spurring.  No acute fracture, suspicious bony lesions, or spinal canal

stenosis.  Sagittal and coronal reformatted images demonstrates normal

alignment with vertebral body heights/disc spaces maintained.  No acute

compression fracture or subluxation.



CT cervical spine and CT chest/abdomen/pelvis reported separately.



Impression: Osteopenia and multilevel degenerative changes.  Negative for

acute fracture or subluxation.

## 2021-02-18 NOTE — XRAY
Indication: Pain following fall.



Sagittal, coronal, and axial reformatted images of the lumbar spine obtained

using raw data from CT abdomen/pelvis of the same day.



Comparison: CT abdomen/pelvis February 8, 2010.



Osseous structures remain demineralized.  Progressive worsening broad-based

disc bulge at L4-S1 level with again L5-S1 degenerative vacuum disc phenomena.

 Facets are symmetric with now mild bilateral L3-L5 degenerative facet

arthropathy.  Sagittal and coronal reformatted images demonstrates normal

alignment with minimal L4-S1 and disc space narrowing.  New remote-appearing

L5 superior endplate fracture with approximately 50% height loss but no spinal

canal/foraminal encroachment.  No acute compression fracture or subluxation.



CT abdomen/pelvis reported separately.



Impression:

1.  Again osteopenia with new remote-appearing L5 superior endplate fracture

and progressive worsening L3-S1 degenerative spondylosis.

2.  Negative acute fracture, subluxation, or spinal canal stenosis.

## 2021-02-18 NOTE — XRAY
Indication: Neck pain following fall.



Multiple contiguous axial images obtained through the cervical spine.

Sagittal and coronal reformatted images obtained.



Comparison: None



Age-related osteopenia.  Axial images negative for acute fracture, suspicious

bony lesions, or spinal canal stenosis.  There is mild multilevel degenerative

endplate spurring and mild/moderate multilevel bilateral degenerative facet

hypertrophy.  Sagittal and coronal reformatted images demonstrates cervical

lordotic reversal, positional versus paraspinal spasm.  2 mm C3

anterolisthesis on C4.  Minimal/mild multilevel disc space narrowing greatest

at C7-T1.  No acute compression fracture or jumped facet.  Normal appearing

craniocervical junction.



Visualized noncontrasted soft tissues demonstrates heavy scattered carotid

calcifications bilaterally.



CT head and CT chest reported separately.



Impression:

1.  Cervical lordotic reversal, positional versus paraspinal spasm.

2.  Osteopenia, multilevel degenerative spondylosis, and minimal grade 1 C3

spondylolisthesis.

3.  Negative acute fracture.

## 2021-02-18 NOTE — XRAY
Indication: Right head injury following fall.



Multiple contiguous axial images obtained through the head without contrast.



Comparison: June 30, 2010.



There is progressive age-appropriate global atrophy and moderate

periventricular degenerative micro-ischemia bilaterally.  No acute

intracranial hemorrhage, abnormal extra-axial fluid collection, or mass

effect.  Fourth ventricle is midline without hydrocephalus.  Bony calvarium

intact.  Again right mastoidectomy.  Remaining visualized paranasal sinuses

and left mastoid air cells are clear.



Impression: Nonacute senile brain.

## 2021-02-18 NOTE — XRAY
Indication: Back pain following fall.



Multiple contiguous axial images obtained through the abdomen and pelvis

without contrast.



Comparison: February 8, 2010.



CT chest reported separately.



New left hip prosthesis produces extreme beam artifact limiting these levels.

Noncontrasted stomach and bowel loops appear nonobstructed.  No free

fluid/air.  Previous 1.8 cm right renal cyst today measures 2.8 cm.  Both

kidneys demonstrate nonobstructing micro-calculi increased in number.

Remaining liver, gallbladder, pancreas, spleen, adrenal glands, kidneys,

ureters, and bladder appear unremarkable for noncontrast exam.



Again heavy scattered arteriosclerotic calcifications.  Interval enlarging AAA

today measuring 6.0 x 6.6 cm in greatest axial dimension, previously 4.9 x 4.9

cm.  Stable aortobiiliac stent graft.  Lack of IV contrast precludes further

characterization.



Osseous structures remain demineralized with progressive worsening lower

lumbar degenerative spondylosis.  New finding remote-appearing L5 superior

endplate fracture with approximately 50% height loss and remote sacrococcygeal

junction fracture.



Impression:

1.  Interval left total hip arthroplasty with intact bipolar prosthesis.

Prosthetic beam artifact limits these levels.

2.  Enlarging AAA with again aortobiiliac stent graft in situ.  Lack of IV

contrast precludes further characterization.

3.  Chronic bony findings including new remote L5 endplate fracture and remote

sacrococcygeal fracture.

4.  Enlarging right renal cyst.  Again nonobstructing bilateral renal

micro-calculi.

5.  Remaining CT abdomen/pelvis without contrast exam is negative.